# Patient Record
(demographics unavailable — no encounter records)

---

## 2025-01-02 NOTE — HISTORY OF PRESENT ILLNESS
[FreeTextEntry1] : 25yoM pmhx of ADHD. Presents today for nausea/vomiting.   Complaining of nausea every morning. States he has had multiple episodes of vomiting with the nausea. States he has had constant diarrhea since high school. Endorses trace blood when wiping. PT endorses that he Vapes and smokes marijuana, social alcohol use.  Denies joint pain, changes in vision.    Patient denies any abdominal pain, dysphagia, heart burn, unintentional weight loss, constipation, melena  Last EGD/Colonoscopy was about 10 years ago

## 2025-01-02 NOTE — PHYSICAL EXAM

## 2025-01-02 NOTE — ASSESSMENT
[FreeTextEntry1] : 25yoM pmhx of ADHD. Presents today for nausea/vomiting.    #Nausea/vomiting #Diarrhea #Trace Rectal bleeding -Discussed possible cyclic vomiting, as well as H. pylori, gastric ulcer, celiac disease etc. -Encouraging avoiding marijuana, carbonated beverages, caffeine, tomato paste, acidic foods, spicy foods, etc.  -Will schedule EGD  -Risks vs. benefits discussed  -Will schedule colonoscopy  -risks vs. benefits discussed -Educated on generic suprep and dulcolax    Follow up after procedurs

## 2025-01-02 NOTE — PHYSICAL EXAM
